# Patient Record
Sex: FEMALE | Race: BLACK OR AFRICAN AMERICAN | Employment: FULL TIME | URBAN - METROPOLITAN AREA
[De-identification: names, ages, dates, MRNs, and addresses within clinical notes are randomized per-mention and may not be internally consistent; named-entity substitution may affect disease eponyms.]

---

## 2019-11-13 ENCOUNTER — APPOINTMENT (OUTPATIENT)
Dept: GENERAL RADIOLOGY | Facility: CLINIC | Age: 49
End: 2019-11-13
Attending: EMERGENCY MEDICINE
Payer: COMMERCIAL

## 2019-11-13 ENCOUNTER — HOSPITAL ENCOUNTER (EMERGENCY)
Facility: CLINIC | Age: 49
Discharge: HOME | End: 2019-11-13
Attending: EMERGENCY MEDICINE | Admitting: EMERGENCY MEDICINE
Payer: COMMERCIAL

## 2019-11-13 VITALS
TEMPERATURE: 98.1 F | RESPIRATION RATE: 18 BRPM | WEIGHT: 154.2 LBS | HEART RATE: 81 BPM | DIASTOLIC BLOOD PRESSURE: 91 MMHG | SYSTOLIC BLOOD PRESSURE: 133 MMHG | OXYGEN SATURATION: 99 %

## 2019-11-13 DIAGNOSIS — S60.211A CONTUSION OF RIGHT WRIST, INITIAL ENCOUNTER: ICD-10-CM

## 2019-11-13 PROCEDURE — 99284 EMERGENCY DEPT VISIT MOD MDM: CPT | Mod: 25 | Performed by: EMERGENCY MEDICINE

## 2019-11-13 PROCEDURE — 29125 APPL SHORT ARM SPLINT STATIC: CPT | Mod: RT | Performed by: EMERGENCY MEDICINE

## 2019-11-13 PROCEDURE — 73080 X-RAY EXAM OF ELBOW: CPT | Mod: RT

## 2019-11-13 PROCEDURE — 99283 EMERGENCY DEPT VISIT LOW MDM: CPT | Mod: 25 | Performed by: EMERGENCY MEDICINE

## 2019-11-13 PROCEDURE — 73110 X-RAY EXAM OF WRIST: CPT | Mod: RT

## 2019-11-13 RX ORDER — EXEMESTANE 25 MG/1
25 TABLET ORAL DAILY
COMMUNITY

## 2019-11-13 ASSESSMENT — ENCOUNTER SYMPTOMS
SHORTNESS OF BREATH: 0
FEVER: 0
ABDOMINAL PAIN: 0

## 2019-11-13 NOTE — ED NOTES
ED Triage Provider Note  [facility]  Encounter Date: Nov 13, 2019  4:58 PM     History:  Chief Complaint   Patient presents with     Wrist Pain     Teresa Morales is a 49 year old female who presents with pain in her right wrist and elbow after a fall.  She fell approximately 1 hour ago on slippery ice.  She fell on the outstretched hand and has swelling at the wrist joint which also was accompanied by pain when she moves it.  Her pain at rest is about 2 or 3 out of 10.  She has mild pain over the radial head.  The patient is a physical therapist.  She denies any head injury loss of consciousness or new weakness or numbness.      Review of Systems:  Review of Systems   Constitutional: Negative for fever.   Respiratory: Negative for shortness of breath.    Cardiovascular: Negative for chest pain.   Gastrointestinal: Negative for abdominal pain.   All other systems reviewed and are negative.        Exam:  BP (!) 155/105   Pulse 71   Temp 98.1  F (36.7  C) (Oral)   Resp 16   Wt 69.9 kg (154 lb 3.2 oz)   SpO2 98%   General: No acute distress. Appears stated age.   No spinal tenderness or signs of head injury  Right wrist has a joint effusion with pain and tenderness over the radial aspect of the distal radius.  Pain with axial load of the thumb    Right elbow: No limited range of motion but tenderness over the proximal radial head.  Able to fully extend and flex at the elbow.  Limited range of motion at the wrist.      Medical Decision Making:  Patient arriving to the ED with problem as above. A medical screening exam was performed.  X-ray of the wrist and elbow orders initiated from Triage. The patient is appropriate to wait in triage.  I offer the patient pain medication but she preferred just ice.          Note created by Rupert Victoria MD on 11/13/2019 at 4:58 PM       Rupert Victoria MD  11/13/19 1658

## 2019-11-13 NOTE — ED TRIAGE NOTES
Patient presents to triage with c/o right wrist pain. Patient states she slipped on ice and fell landing on right arm. Patient reports right wrist and elbow pain. Denies hitting head or other injuries.

## 2019-11-13 NOTE — ED AVS SNAPSHOT
[facility], [place], Emergency Department  [address] [place] [id number]  Phone:  [phone]                                    Teresa Morales   MRN: [id number]    Department:  [facility], Emergency Department   Date of Visit:  11/13/2019           After Visit Summary Signature Page    I have received my discharge instructions, and my questions have been answered. I have discussed any challenges I see with this plan with the nurse or doctor.    ..........................................................................................................................................  Patient/Patient Representative Signature      ..........................................................................................................................................  Patient Representative Print Name and Relationship to Patient    ..................................................               ................................................  Date                                   Time    ..........................................................................................................................................  Reviewed by Signature/Title    ...................................................              ..............................................  Date                                               Time          22EPIC Rev 08/18

## 2019-11-14 NOTE — DISCHARGE INSTRUCTIONS
Please make an appointment to follow up with an orthopedist at home in 10-14 days.     Ice and elevate.     Return if worse pain or new numbness.

## 2019-11-14 NOTE — ED PROVIDER NOTES
History     Chief Complaint   Patient presents with     Wrist Pain     HPI  Teresa Morales is a 49 year old female who presents with pain in her right wrist and elbow after a fall.  She fell approximately 1 hour ago on slippery ice.  She fell on the outstretched hand and has swelling at the wrist joint which also was accompanied by pain when she moves it.  Her pain at rest is about 2 or 3 out of 10.  She has mild pain over the radial head.  The patient is a physical therapist.  She denies any head injury loss of consciousness or new weakness or numbness.    I have reviewed the Medications, Allergies, Past Medical and Surgical History, and Social History in the Epic system.    Review of Systems   Constitutional: Negative for fever.   Respiratory: Negative for shortness of breath.    Cardiovascular: Negative for chest pain.   Gastrointestinal: Negative for abdominal pain.   All other systems reviewed and are negative.      Physical Exam   BP: (!) 155/105  Pulse: 71  Temp: 98.1  F (36.7  C)  Resp: 16  Weight: 69.9 kg (154 lb 3.2 oz)(scale)  SpO2: 98 %      Physical Exam  HENT:      Head: Atraumatic.   Eyes:      Pupils: Pupils are equal, round, and reactive to light.   Cardiovascular:      Rate and Rhythm: Regular rhythm.      Heart sounds: Normal heart sounds.   Pulmonary:      Effort: No respiratory distress.      Breath sounds: Normal breath sounds.   Chest:      Chest wall: No tenderness.   Abdominal:      Tenderness: There is no abdominal tenderness.   Musculoskeletal:      Cervical back: She exhibits no tenderness.      Thoracic back: She exhibits no tenderness.      Lumbar back: She exhibits no tenderness.   Neurological:      Mental Status: She is oriented to person, place, and time.         ED Course        Procedures             Critical Care time:  none             Labs Ordered and Resulted from Time of ED Arrival Up to the Time of Departure from the ED - No data to display         Assessments &  Plan (with Medical Decision Making)   The patient has an isolated injury to the right wrist.  She also felt some pain in her elbow but the x-ray was normal.  Her wrist has a significant effusion and tenderness near the snuffbox.  The x-ray does not demonstrate a fracture.  She was still having significant pain on any movement and uncomfortable in the premade Velcro splint.  I placed her in a fiberglass molded splint which included both a thumb spica for possible occult scaphoid fracture and a volar splint to the MP joint to immobilize her wrist.  The patient is from out of town and traveling home in 2 days.  She will follow-up with an orthopedist in 10 days for a recheck and repeat x-ray for possible occult fracture and keep the splint on and dry until then.    I have reviewed the nursing notes.    I have reviewed the findings, diagnosis, plan and need for follow up with the patient.    New Prescriptions    No medications on file       Final diagnoses:   Contusion of right wrist, initial encounter       11/13/2019   [facility], [place], EMERGENCY DEPARTMENT     Rupert Victoria MD  11/13/19 1946

## 2020-03-11 ENCOUNTER — HEALTH MAINTENANCE LETTER (OUTPATIENT)
Age: 50
End: 2020-03-11

## 2021-01-04 ENCOUNTER — HEALTH MAINTENANCE LETTER (OUTPATIENT)
Age: 51
End: 2021-01-04

## 2021-04-25 ENCOUNTER — HEALTH MAINTENANCE LETTER (OUTPATIENT)
Age: 51
End: 2021-04-25

## 2021-10-10 ENCOUNTER — HEALTH MAINTENANCE LETTER (OUTPATIENT)
Age: 51
End: 2021-10-10

## 2022-05-22 ENCOUNTER — HEALTH MAINTENANCE LETTER (OUTPATIENT)
Age: 52
End: 2022-05-22

## 2022-09-18 ENCOUNTER — HEALTH MAINTENANCE LETTER (OUTPATIENT)
Age: 52
End: 2022-09-18

## 2023-06-04 ENCOUNTER — HEALTH MAINTENANCE LETTER (OUTPATIENT)
Age: 53
End: 2023-06-04